# Patient Record
Sex: FEMALE | Race: WHITE | Employment: FULL TIME | URBAN - METROPOLITAN AREA
[De-identification: names, ages, dates, MRNs, and addresses within clinical notes are randomized per-mention and may not be internally consistent; named-entity substitution may affect disease eponyms.]

---

## 2017-06-07 ENCOUNTER — HOSPITAL ENCOUNTER (EMERGENCY)
Age: 52
Discharge: HOME | End: 2017-06-07
Attending: EMERGENCY MEDICINE
Payer: COMMERCIAL

## 2017-06-07 ENCOUNTER — APPOINTMENT (OUTPATIENT)
Dept: ULTRASOUND IMAGING | Age: 52
End: 2017-06-07
Attending: EMERGENCY MEDICINE
Payer: COMMERCIAL

## 2017-06-07 ENCOUNTER — APPOINTMENT (OUTPATIENT)
Dept: GENERAL RADIOLOGY | Age: 52
End: 2017-06-07
Attending: EMERGENCY MEDICINE
Payer: COMMERCIAL

## 2017-06-07 VITALS
BODY MASS INDEX: 22.2 KG/M2 | OXYGEN SATURATION: 99 % | RESPIRATION RATE: 20 BRPM | DIASTOLIC BLOOD PRESSURE: 69 MMHG | SYSTOLIC BLOOD PRESSURE: 140 MMHG | WEIGHT: 130 LBS | TEMPERATURE: 98 F | HEIGHT: 64 IN | HEART RATE: 60 BPM

## 2017-06-07 DIAGNOSIS — M25.562 ACUTE PAIN OF LEFT KNEE: Primary | ICD-10-CM

## 2017-06-07 PROCEDURE — 73560 X-RAY EXAM OF KNEE 1 OR 2: CPT | Performed by: EMERGENCY MEDICINE

## 2017-06-07 PROCEDURE — 99284 EMERGENCY DEPT VISIT MOD MDM: CPT

## 2017-06-07 PROCEDURE — 93971 EXTREMITY STUDY: CPT | Performed by: EMERGENCY MEDICINE

## 2017-06-07 RX ORDER — IBUPROFEN 600 MG/1
600 TABLET ORAL ONCE
Status: COMPLETED | OUTPATIENT
Start: 2017-06-07 | End: 2017-06-07

## 2017-06-08 NOTE — ED PROVIDER NOTES
Patient Seen in: THE [facility] Emergency Department In [place]    History   Patient presents with:  Deep Vein Thrombosis (cardiovascular)    Stated Complaint: pain to Lt calf.  Recent long travel    HPI  Patient is a 49-year-old female who states over the pas distress. HEENT: Extraocular muscles intact, pupils equal round reactive to light and accommodation. Mouth normal, neck supple, no meningismus.   EXTREMITIES: Left lower extremity, mild tenderness posterior aspect of the knee, no effusion noted, no masses

## 2017-06-08 NOTE — ED INITIAL ASSESSMENT (HPI)
STS LEFT CALF AND POST. KNEE PAIN X 1 MONTH AND WORSENING TODAY. STS HAD 4 HOUR CAR RIDE THIS PAST WEEKEND. DENIES CP AND SOB.

## 2017-06-14 ENCOUNTER — OFFICE VISIT (OUTPATIENT)
Dept: INTERNAL MEDICINE CLINIC | Facility: CLINIC | Age: 52
End: 2017-06-14

## 2017-06-14 VITALS
SYSTOLIC BLOOD PRESSURE: 106 MMHG | TEMPERATURE: 99 F | RESPIRATION RATE: 16 BRPM | HEART RATE: 72 BPM | HEIGHT: 64 IN | WEIGHT: 131 LBS | BODY MASS INDEX: 22.36 KG/M2 | DIASTOLIC BLOOD PRESSURE: 60 MMHG

## 2017-06-14 DIAGNOSIS — R53.83 OTHER FATIGUE: ICD-10-CM

## 2017-06-14 DIAGNOSIS — Z13.29 SCREENING FOR THYROID DISORDER: ICD-10-CM

## 2017-06-14 DIAGNOSIS — S16.1XXA NECK STRAIN, INITIAL ENCOUNTER: ICD-10-CM

## 2017-06-14 DIAGNOSIS — M25.562 LEFT KNEE PAIN, UNSPECIFIED CHRONICITY: Primary | ICD-10-CM

## 2017-06-14 DIAGNOSIS — R50.9 FEVER, UNSPECIFIED FEVER CAUSE: ICD-10-CM

## 2017-06-14 DIAGNOSIS — Z13.1 SCREENING FOR DIABETES MELLITUS: ICD-10-CM

## 2017-06-14 DIAGNOSIS — Z13.220 SCREENING, LIPID: ICD-10-CM

## 2017-06-14 PROCEDURE — 99204 OFFICE O/P NEW MOD 45 MIN: CPT | Performed by: FAMILY MEDICINE

## 2017-06-14 RX ORDER — CYCLOBENZAPRINE HCL 10 MG
TABLET ORAL
Qty: 21 TABLET | Refills: 0 | Status: SHIPPED | OUTPATIENT
Start: 2017-06-14

## 2017-06-15 NOTE — PATIENT INSTRUCTIONS
Febrile Illness, Uncertain Cause (Adult)  You have a fever, but the cause is not certain. A fever is a natural reaction of the body to an illness such as infection due to a virus or bacteria. In most cases, the temperature itself is not harmful.  It [place] · Over-the-counter medicines will not shorten the duration of the illness. However, they may be helpful for the following symptoms: cough, sore throat, or nasal and sinus congestion. Ask your pharmacist for product suggestions.  (Note: Do not use decongesta

## 2017-06-15 NOTE — PROGRESS NOTES
HPI:    Patient ID: Akers  is a 45year old female. HPI Here with c/o left knee pain with no clear injury or cause x 1.5 months. Went to ER 2 weeks ago for this due to concern for blood clot. Work-up was negative for DVT.  Since then pain has been HENT: Negative for congestion, ear pain and sinus pressure. Eyes: Negative for photophobia and visual disturbance. Respiratory: Negative for chest tightness and shortness of breath. Cardiovascular: Negative for chest pain and palpitations.    Elier Bran 1. Reviewed ER notes and imaging. Start use of pull-on knee brace and use at all times. If not continuing to slowly get better will plan PT. Continue ibuprofen for knee, fever, neck. 2. Continue Chiropractor. Continue to try to move neck more.  Ibuprofen,

## 2017-06-16 ENCOUNTER — TELEPHONE (OUTPATIENT)
Dept: INTERNAL MEDICINE CLINIC | Facility: CLINIC | Age: 52
End: 2017-06-16

## 2017-06-16 NOTE — TELEPHONE ENCOUNTER
Patient notified labs from [employer] have some abnormalities in her CRP and liver function tests, may be due to a virus and should schedule an appt with AMS to talk about results.   Pt states she has not felt well lately and would like to come in to talk wit

## 2017-06-16 NOTE — TELEPHONE ENCOUNTER
Please call patient. How is she feeling? We received her labs results and there are some abnormalities in her inflammatory marker (CRP) and liver function tests.  This may due to a virus but she should make an appt with me to discuss further and talk about

## 2017-06-20 ENCOUNTER — OFFICE VISIT (OUTPATIENT)
Dept: INTERNAL MEDICINE CLINIC | Facility: CLINIC | Age: 52
End: 2017-06-20

## 2017-06-20 VITALS
BODY MASS INDEX: 22.02 KG/M2 | TEMPERATURE: 98 F | SYSTOLIC BLOOD PRESSURE: 120 MMHG | WEIGHT: 129 LBS | RESPIRATION RATE: 16 BRPM | HEIGHT: 64 IN | HEART RATE: 92 BPM | DIASTOLIC BLOOD PRESSURE: 74 MMHG

## 2017-06-20 DIAGNOSIS — M25.562 ACUTE PAIN OF LEFT KNEE: ICD-10-CM

## 2017-06-20 DIAGNOSIS — M54.2 NECK PAIN: Primary | ICD-10-CM

## 2017-06-20 DIAGNOSIS — R74.8 ELEVATED LIVER ENZYMES: ICD-10-CM

## 2017-06-20 PROCEDURE — 99213 OFFICE O/P EST LOW 20 MIN: CPT | Performed by: FAMILY MEDICINE

## 2017-06-20 RX ORDER — GABAPENTIN 300 MG/1
300 CAPSULE ORAL 3 TIMES DAILY
Qty: 30 CAPSULE | Refills: 0 | Status: SHIPPED | OUTPATIENT
Start: 2017-06-20 | End: 2017-06-30

## 2017-06-20 RX ORDER — DOXYCYCLINE HYCLATE 100 MG
100 TABLET ORAL 2 TIMES DAILY
Qty: 42 TABLET | Refills: 0 | Status: SHIPPED | OUTPATIENT
Start: 2017-06-20 | End: 2017-07-11

## 2017-06-22 NOTE — PROGRESS NOTES
HPI:    Patient ID: Gabbie Bhardwaj is a 45year old female. HPI Here for f/u on lab results. Patient has continued to have symptoms of neck pain, knee pain, fatigue.  Notes after being seen last week patient had massage and massage therapist noticed round Allergies   PHYSICAL EXAM:   Physical Exam   Constitutional: She appears well-developed and well-nourished. HENT:   Head: Normocephalic and atraumatic. Neck: Normal range of motion. Pulmonary/Chest: Effort normal.   Neurological: She is alert.    Psyc

## 2017-06-26 ENCOUNTER — TELEPHONE (OUTPATIENT)
Dept: INTERNAL MEDICINE CLINIC | Facility: CLINIC | Age: 52
End: 2017-06-26

## 2017-06-26 NOTE — TELEPHONE ENCOUNTER
Called pt to advise info per AMS that some of her lab results but her Lyme testing is still pending. Pt informed that her liver enzymes and her CRP are all nearly back to normal (much improved from last testing) so no further tested needed.  Pt verbalized u

## 2017-06-26 NOTE — TELEPHONE ENCOUNTER
Please call patient. I received some of her lab results but her Lyme testing is still pending. Her liver enzymes and her CRP are all nearly back to normal (much improved from last testing) so we don't need to do any further following of those labs.  Let her

## 2017-06-27 ENCOUNTER — TELEPHONE (OUTPATIENT)
Dept: INTERNAL MEDICINE CLINIC | Facility: CLINIC | Age: 52
End: 2017-06-27

## 2017-06-27 NOTE — TELEPHONE ENCOUNTER
Please call patient. We received the rest of her lab results on Lyme testing. Some of the Lyme tests came back positive and some came back negative.  But because she had viral illness symptoms, if she had many certain viruses that caused her symptoms those

## 2017-06-27 NOTE — TELEPHONE ENCOUNTER
stewart  S/w pt, reviewed AMS remarks. Pt verbs und and will complete the Doxycyclene. She has good and bad days, ie Sunday she could hardly walk, took Gabapentin today and feels very good, and walking great! .  For the most part she is better.   WCB if any pr

## 2017-06-30 ENCOUNTER — TELEPHONE (OUTPATIENT)
Dept: INTERNAL MEDICINE CLINIC | Facility: CLINIC | Age: 52
End: 2017-06-30

## 2017-06-30 RX ORDER — GABAPENTIN 300 MG/1
300 CAPSULE ORAL 3 TIMES DAILY
Qty: 30 CAPSULE | Refills: 0 | Status: SHIPPED | OUTPATIENT
Start: 2017-06-30

## 2017-06-30 NOTE — TELEPHONE ENCOUNTER
LOV 6/20/17   Noted at ov: Offered Ortho and patient would like to continue to see if pain continues to resolve- will f/u if pain persists. Pt states that she is still having pain on her shoulder and knees but the medication has help tremendously.  Pain

## 2017-06-30 NOTE — TELEPHONE ENCOUNTER
Rx for gabapentin sent. It looks like patient has Rx for Doxycycline until 7/11/17. Can she send over the [facility] because I don't see where they are recommending longer treatment.  I see where they list some studies done to see if t

## 2017-07-03 NOTE — TELEPHONE ENCOUNTER
Called pt to advise info per AMS that   1. Rx for gabapentin sent. Pt is aware she has Rx for Doxycycline until 7/11/17.    2.Pt was asked to to send over the info she is reading from the [facility] because AMS does not see where they are recommending longer txt on

## 2017-12-05 ENCOUNTER — PATIENT MESSAGE (OUTPATIENT)
Dept: INTERNAL MEDICINE CLINIC | Facility: CLINIC | Age: 52
End: 2017-12-05

## 2017-12-05 NOTE — TELEPHONE ENCOUNTER
My Chart messaged pt to contact the office to schedule yearly physical. Also asked when her last mammogram was completed.

## 2019-08-22 ENCOUNTER — PATIENT OUTREACH (OUTPATIENT)
Dept: INTERNAL MEDICINE CLINIC | Facility: CLINIC | Age: 54
End: 2019-08-22

## 2019-08-22 NOTE — PROGRESS NOTES
Sent message a mychart to pt letting her know that she is due for a physical and a mamm to call us to schedule.

## 2022-02-24 PROBLEM — N81.10 VAGINAL PROLAPSE: Status: ACTIVE | Noted: 2022-02-24

## 2022-02-24 PROBLEM — Z78.0 POSTMENOPAUSAL: Status: ACTIVE | Noted: 2022-02-24

## (undated) NOTE — LETTER
07/27/17        [address]  [place] [address]      Dear Paula Link,    [facility] records indicate that you have outstanding lab work and or testing that was ordered for you and has not yet been completed:          Comp Metabolic Panel      [place]

## (undated) NOTE — MR AVS SNAPSHOT
[facility] [address] [address] [phone]               Thank you for choosing us for your health care visit with Timothy Aponte DO.   We are glad to serve you and happy to provide you with this summa 1/2 to one full tab PO TID PRN   Commonly known as:  cyclobenzaprine           Doxycycline Hyclate 100 MG Tabs   Take 1 tablet (100 mg total) by mouth 2 (two) times daily.    Commonly known as:  VIBRAMYCIN           gabapentin 300 MG Caps   Take 1 capsule (

## (undated) NOTE — ED AVS SNAPSHOT
[facility] in [address] [place] [address]    Phone:  [phone]    Fax:  [phone]           Huber De La Torre   MRN: [id number]    Department:  [facility] in [place]   Date of Visit: IF THERE IS ANY CHANGE OR WORSENING OF YOUR CONDITION, CALL YOUR PRIMARY CARE PHYSICIAN AT ONCE OR RETURN IMMEDIATELY TO THE EMERGENCY DEPARTMENT.     If you have been prescribed any medication(s), please fill your prescription right away and begin taking t

## (undated) NOTE — ED AVS SNAPSHOT
THE [facility] Emergency Department in [address] [facility]    Phone:  [phone]    Fax:  [phone]           Eleno Galaviz   MRN: [id number]    Department:  THE [facility] Emergency Department in [place]   Date of Visit: at [phone]    Si usted tiene algun problema con ojeda sequimiento, por favor llame a nuestro adminstrador de casos al [phone]    Expect to receive an electronic request (by e-mail or text) to complete a self-assessment the day after your visit. Leandra Boo [address] Sharma ([facility]) [address] Abena Hernandez. ([facility]) [address]  ([address]) [address] [facility] INDICATIONS:  pain to Lt calf. Recent long travel     PATIENT STATED HISTORY: (As transcribed by Technologist)  Patient states left knee has been bothering her for 1 month but for the past 3-4 days pain has increased.   Patient states pain is to posterior k your Zip Code and Date of Birth to complete the sign-up process. If you do not sign up before the expiration date, you must request a new code.     Your unique [employer] Access Code: [email or web address]  Expires: 8/6/2017  8:57 PM    If you have questions, you can ca